# Patient Record
Sex: FEMALE | Race: WHITE | NOT HISPANIC OR LATINO | URBAN - METROPOLITAN AREA
[De-identification: names, ages, dates, MRNs, and addresses within clinical notes are randomized per-mention and may not be internally consistent; named-entity substitution may affect disease eponyms.]

---

## 2018-07-26 ENCOUNTER — EMERGENCY (EMERGENCY)
Facility: HOSPITAL | Age: 53
LOS: 0 days | Discharge: HOME | End: 2018-07-26
Attending: EMERGENCY MEDICINE
Payer: COMMERCIAL

## 2018-07-26 VITALS
OXYGEN SATURATION: 98 % | WEIGHT: 169.98 LBS | DIASTOLIC BLOOD PRESSURE: 72 MMHG | SYSTOLIC BLOOD PRESSURE: 115 MMHG | HEART RATE: 83 BPM | RESPIRATION RATE: 16 BRPM | TEMPERATURE: 99 F | HEIGHT: 72 IN

## 2018-07-26 DIAGNOSIS — M54.2 CERVICALGIA: ICD-10-CM

## 2018-07-26 DIAGNOSIS — M54.12 RADICULOPATHY, CERVICAL REGION: ICD-10-CM

## 2018-07-26 PROCEDURE — 99283 EMERGENCY DEPT VISIT LOW MDM: CPT

## 2018-07-26 RX ORDER — ACETAMINOPHEN WITH CODEINE 300MG-30MG
1 TABLET ORAL
Qty: 18 | Refills: 0 | OUTPATIENT
Start: 2018-07-26 | End: 2018-07-31

## 2018-07-26 RX ORDER — METHOCARBAMOL 500 MG/1
1 TABLET, FILM COATED ORAL
Qty: 14 | Refills: 0 | OUTPATIENT
Start: 2018-07-26 | End: 2018-08-01

## 2018-07-26 RX ORDER — METHOCARBAMOL 500 MG/1
750 TABLET, FILM COATED ORAL ONCE
Qty: 0 | Refills: 0 | Status: COMPLETED | OUTPATIENT
Start: 2018-07-26 | End: 2018-07-26

## 2018-07-26 RX ORDER — KETOROLAC TROMETHAMINE 30 MG/ML
30 SYRINGE (ML) INJECTION ONCE
Qty: 0 | Refills: 0 | Status: DISCONTINUED | OUTPATIENT
Start: 2018-07-26 | End: 2018-07-26

## 2018-07-26 RX ADMIN — Medication 30 MILLIGRAM(S): at 19:25

## 2018-07-26 RX ADMIN — METHOCARBAMOL 750 MILLIGRAM(S): 500 TABLET, FILM COATED ORAL at 19:13

## 2018-07-26 RX ADMIN — Medication 30 MILLIGRAM(S): at 19:13

## 2018-07-26 NOTE — ED ADULT NURSE NOTE - OBJECTIVE STATEMENT
AO X3 , C/O RIGHT SIDE NECK PAIN , RT SHOULDER PAIN , RIGHT ARM PAIN X 8 DAYS , STTATED SHE WOKE UP WITH IT , DENIES TRAUMA . PAIN AS SHARP AND CONSTANT

## 2019-05-02 NOTE — ED ADULT NURSE NOTE - PAIN RATING/NUMBER SCALE (0-10): ACTIVITY
DATE OF SERVICE:  05/01/2019    SERVICE:  Plastic surgery.    SURGEON:  Hasmukh Espino MD    ANESTHESIOLOGIST:  Irina Lewis MD    ANESTHESIA:  General.    PREOPERATIVE DIAGNOSES:  Bilateral old saline breast implants with deflating   right saline breast implant, lateral displacement of implants and lateral   malposition of nipple areolar complexes.    POSTOPERATIVE DIAGNOSES:  Bilateral old saline breast implants with deflating   right saline breast implant, lateral displacement of implants and lateral   malposition of nipple areolar complexes.    OPERATIVE PROCEDURE:  Bilateral exchange of saline to silicone breast implants   with repositioning of lateral folds and medial repositioning of nipple   areolar complexes.    INDICATIONS:  A 37-year-old female underwent bilateral augmentation   mammoplasty in 11/2008 where Allergan style 68 high profile 650 mL saline   breast implants were placed in each breast.  She is unsure of how much was   filled in these implants and over the last number of months, the right implant   appears to be deflating and smaller and more rippling.  She also had severe,   left worse than right lateral displacement of the implants and lateral   malposition of the nipple areolar complexes where the left is slightly lower.    The implants feel as if they are submuscular.  The patient wishes to undergo   these procedures under general anesthesia as an outpatient and wishes to use a   Sientra smooth round moderate plus profile 650 mL silicone breast implants on   each side if symmetry is acceptable.  She understands risks, benefits, and   alternatives including but not limited to the risk of bleeding, hematoma,   seroma, infection, wound dehiscence, painful or unsightly scarring,   hypertrophic or keloid scarring, painful neuroma, sensory loss or decrease,   nipple sensory loss or decrease, nipple necrosis, nipple malposition,   asymmetry or irregularity, skin, fat, breast and muscle  necrosis, benign or   malignant breast disease, inability to nurse, injury to muscle, injury to   ribs, pneumothorax, DVT, pulmonary embolism, fat embolism, implant infection,   capsular contracture, deflation, rupture or leakage, extrusion or exposure,   complications related to silicone, silicone granuloma, silicone lymphadenitis,   silicone extrusion, migration, palpability or visibility, implants that are   too large or too small, implant firmness, tenderness, rippling, asymmetry or   irregularity, chronic breast or chest pain, lymphedema, pigmentation changes,   stretch marks, ptosis, telangiectasias, bottoming out, lateral displacement,   poor definition of upper poles or cleavage, the need for mastopexy,   complications related to Ethibond lateral fold repositioning sutures, chronic   pain, persistent or recurrent malposition of nipple areolar complexes, changes   with weight gain or loss, changes with aging, medication, health condition,   trauma, infection, sun exposure, pregnancy and nursing, cardiovascular or   cardiorespiratory compromise, aspiration pneumonitis, hemorrhage, need for   transfusion, complications related to anesthesia, complications related to   sutures, benzoin, Steri-Strips, dressings and Ace wraps, mortality, and need   for future revision.  The patient motivated and signed informed consent.    DESCRIPTION OF OPERATION:  The patient was marked preoperatively in sitting   position indicating midlines as well as inframammary fold and level of second   rib.  It was noted that both implants appeared to be submuscular and she has   left greater than right significant lateral displacement of the implants.  The   left implant has a low position and the right implant appears to be partly   deflated with significant rippling and smaller in size as well as submuscular.    There was also lateral displacement, but to a lesser degree.  The capsules   were supple and slightly firmer on the right.   The patient has lateral   malposition of the nipple areolar complexes where the left was more lateral   and slightly more inferior in position.  Again, patient confirmed that she did   not wish to undergo mastopexy at this time, but understands she may require   mastopexy to achieve best results.  Again, she confirmed she wishes to have   the 650 mL silicone implants on each side if possible.  The patient was also   marked for repositioning of lateral fold and medial repositioning of the   nipple areolar complexes.  The left nipple areolar complex will be   repositioned slightly more medially and little superiorly to achieve better   symmetry with the right.  This may cause some temporary tightness of the   lateral breast again on the left.  The patient was then taken to the OR where   she was prepped and draped in supine position under general anesthesia.    Sequential stockings were placed.  Starting on the right breast, it was   infiltrated with 0.5% Xylocaine with 1:200,000 dilution epinephrine.  A   medially oriented incision was made along the markings directly medial to the   right nipple areolar complex for about 2.5 cm.  A curvilinear incision was   then made along the medial border of the areola joining the incision and the   skin within was deepithelialized with 15 blade and needle tip electrocautery.    Using needle tip electrocautery, dissection was carried through subcutaneous   fat and breast tissue down to the lower edge of the pectoralis major muscle   and fascia.  The grade II capsule was then dissected transversely gaining   access to the pocket where there was no evidence of free fluid and no sign of   infection.  The implant was removed and noted to be an Allergan style 68HP-650   smooth round high profile 650 mL saline breast implant, which was evacuated   for only 520 mL as it has been deflating over time.  It appears that there was   fibrous tissue in the valve causing the deflation.  The  implant was   discarded.  The wound was irrigated with triple antibiotic saline solution and   hemostasis secured using electrocautery.  At this point, using   electrocautery, lateral chest wall up to the lateral aspect of the pectoralis   minor muscle fascia and along the lateral aspect of the breast, cautery was   used to incise into the capsule in order to enhance blood supply for the   repositioning of the lateral fold.  This was done from the anterior axillary   fold all the way down to the lateral aspect of the inframammary fold.  The   right lateral fold was then advanced about 2.5 cm using running buried 2-0   Ethibond suture from the level of the anterior axillary fold all the way down   to lateral extent of the inframammary fold and then the second layer cephalad   bearing the knots.  This had highly satisfactory repositioning of lateral fold   and contour.  The breast was then packed with triple antibiotic saline soaked   lap sponge and similar procedure was performed on the left side where the   breast was infiltrated with 0.5% Xylocaine with 1:200,000 dilution   epinephrine.  On the left side, as mentioned earlier, the medially oriented   and slightly superiorly oriented curvilinear incision was made through the   marking for about 3 cm medial to the nipple areolar complex and then a   curvilinear incision along the medial border of the nipple areolar complex on   the left.  This was done with a 15 blade and then excision of the intervening   skin with needle tip electrocautery and 15 blade.  Hemostasis secured and   wound irrigated.  Dissection was then carried through the subcutaneous fat and   breast tissue down to the lower edge of the pectoralis major muscle and   fascia and then through the grade I capsule entering the submuscular pocket   where there was no free fluid and no sign of infection.  The implant was   removed and again noted to be an RaisedDigitalan style 68HP-650 smooth round high   profile  650 mL saline breast implant, which was evacuated for 670 mL.  The   implant was discarded.  Wound was irrigated with triple antibiotic saline   solution and hemostasis was secured using electrocautery.  At this point, the   lateral chest wall and lateral aspect of the breast were cauterized to make   incisions in the capsule in order to enhance blood supply for the   repositioning of the lateral fold.  Since the left side was worse than right,   it was advanced about 3.5 cm using a 2 layered buried running 2-0 Ethibond   suture reapproximating the lateral breast to the lateral chest wall fascia and   pectoralis minor muscle and fascia.  This again was done from the anterior   axillary fold down to lateral extent of the inframammary fold.  Excellent   contour and improvement was noted.  The left breast was also packed with lap   sponge, soaked with triple antibiotic saline solution.  The sponges were   removed and hemostasis secured.  Further irrigation and then the patient was   placed in sitting position without implants and noted to have relatively good   symmetry without the implants.  She was placed back in supine position and   then it was decided to use the Sientra smooth round moderate plus profile 650   mL silicone implant on this side.  Starting on the right side, the Sientra   smooth round moderate plus profile 650 mL silicone implant was irrigated with   triple antibiotic saline solution and placed in the right breast partial   submuscular pocket and similarly, the Sientra smooth round moderate plus   profile 650 mL silicone implant was irrigated with triple antibiotic saline   solution and placed in the left breast partial submuscular pocket.  The   patient again was assessed in sitting and supine position and noted to have   highly satisfactory symmetry and improvement.  The bilateral breast wounds   were then closed using interrupted buried 4-0 Monocryl suture to reapproximate   the capsules and  muscle edge as well as interrupted buried 4-0 Monocryl   sutures to close the breast tissue, subcutaneous fat and dermis.  Finally, the   skin incisions were closed after undermining on the medial aspects of the   areola, avoiding injury to the nerve, to the nipples and then medially   repositioned the nipple areolar complexes using interrupted buried dermal 4-0   Monocryl sutures followed by running subcuticular 4-0 Prolene in the skin.  A   few interrupted sutures were placed.  The patient had excellent capillary   refill and perfusion to bilateral nipple areolar complexes and adjacent breast   skin at the end of procedure.  There was some tension on the lateral breast   skin on the left related to the repositioning of the nipple areolar complex,   which was slightly more than the right.  This will improve over time.  Benzoin   and Steri-Strips were applied and the breasts were dressed with Xeroform   followed by fluff dry gauze and ABD pads, secured snug but not too tightly   with 6-inch Ace wrap.  She had approximately 30 mL blood loss and no   complications.  All counts correct at the end of procedure.  Patient was   awakened from anesthesia, extubated and returned to recovery room in stable   condition.       ____________________________________     MD ISABEL BELCHER / DARYA    DD:  05/02/2019 07:25:24  DT:  05/02/2019 08:10:42    D#:  [id number]  Job#:  [id number]     10